# Patient Record
Sex: MALE | Race: WHITE | NOT HISPANIC OR LATINO | Employment: OTHER | URBAN - METROPOLITAN AREA
[De-identification: names, ages, dates, MRNs, and addresses within clinical notes are randomized per-mention and may not be internally consistent; named-entity substitution may affect disease eponyms.]

---

## 2022-10-27 RX ORDER — NAPROXEN SODIUM 220 MG/1
81 TABLET, FILM COATED ORAL DAILY
COMMUNITY
Start: 2022-04-09

## 2022-10-27 RX ORDER — FUROSEMIDE 20 MG/1
20 TABLET ORAL DAILY
COMMUNITY

## 2022-10-27 RX ORDER — CLOPIDOGREL BISULFATE 75 MG/1
75 TABLET ORAL DAILY
COMMUNITY
Start: 2022-05-31 | End: 2022-11-02

## 2022-10-27 RX ORDER — METFORMIN HYDROCHLORIDE 500 MG/1
500 TABLET ORAL 2 TIMES DAILY
COMMUNITY
Start: 2022-03-02

## 2022-10-27 RX ORDER — LANOLIN ALCOHOL/MO/W.PET/CERES
400 CREAM (GRAM) TOPICAL DAILY
COMMUNITY
End: 2023-11-02

## 2022-10-27 RX ORDER — POLYETHYLENE GLYCOL 3350 17 G/17G
17 POWDER, FOR SOLUTION ORAL NIGHTLY PRN
COMMUNITY

## 2022-10-27 RX ORDER — ACETAMINOPHEN 325 MG/1
650 TABLET ORAL EVERY 6 HOURS PRN
COMMUNITY

## 2022-10-27 RX ORDER — GLIMEPIRIDE 2 MG/1
2 TABLET ORAL DAILY
COMMUNITY

## 2022-10-27 RX ORDER — CYST/ALA/Q10/PHOS.SER/DHA/BROC 100-20-50
150 POWDER (GRAM) ORAL DAILY
COMMUNITY

## 2022-10-27 RX ORDER — DOCUSATE SODIUM 100 MG/1
100 CAPSULE, LIQUID FILLED ORAL EVERY MORNING
COMMUNITY
End: 2023-11-02

## 2022-10-27 RX ORDER — PIOGLITAZONEHYDROCHLORIDE 30 MG/1
30 TABLET ORAL DAILY
COMMUNITY
Start: 2022-03-02

## 2022-11-02 ENCOUNTER — OFFICE VISIT (OUTPATIENT)
Dept: NEPHROLOGY | Facility: CLINIC | Age: 87
End: 2022-11-02
Payer: MEDICARE

## 2022-11-02 VITALS
OXYGEN SATURATION: 100 % | HEART RATE: 65 BPM | SYSTOLIC BLOOD PRESSURE: 126 MMHG | BODY MASS INDEX: 28.19 KG/M2 | DIASTOLIC BLOOD PRESSURE: 60 MMHG | WEIGHT: 169.19 LBS | HEIGHT: 65 IN

## 2022-11-02 DIAGNOSIS — N18.32 STAGE 3B CHRONIC KIDNEY DISEASE: Primary | ICD-10-CM

## 2022-11-02 PROCEDURE — 99213 PR OFFICE/OUTPT VISIT, EST, LEVL III, 20-29 MIN: ICD-10-PCS | Mod: S$GLB,,, | Performed by: INTERNAL MEDICINE

## 2022-11-02 PROCEDURE — 99213 OFFICE O/P EST LOW 20 MIN: CPT | Mod: S$GLB,,, | Performed by: INTERNAL MEDICINE

## 2022-11-02 RX ORDER — SACUBITRIL AND VALSARTAN 24; 26 MG/1; MG/1
1 TABLET, FILM COATED ORAL 2 TIMES DAILY
COMMUNITY
Start: 2022-10-27

## 2022-11-02 RX ORDER — CARVEDILOL 6.25 MG/1
6.25 TABLET ORAL 2 TIMES DAILY
COMMUNITY
Start: 2022-10-11

## 2022-11-02 RX ORDER — PANTOPRAZOLE SODIUM 40 MG/1
40 TABLET, DELAYED RELEASE ORAL DAILY
COMMUNITY
Start: 2022-09-26

## 2022-11-02 RX ORDER — ATORVASTATIN CALCIUM 80 MG/1
80 TABLET, FILM COATED ORAL DAILY
COMMUNITY
Start: 2022-09-25

## 2022-11-02 NOTE — PROGRESS NOTES
Pt Name:  Pb Connolly  Pt :  1935  Pt MRN:  [id number]    Date: 2022  Provider: Chastity Younger    Reason for visit: seeing for ckd.      Chief Complaint:   Chief Complaint   Patient presents with    Chronic Kidney Disease     Stage 3 cr 1.43 GFR 44       HPI:  [unfilled]  No complaints.    History:   Past Medical History:   Diagnosis Date    Arrhythmia     CAD (coronary artery disease)     CHF (congestive heart failure)     CKD (chronic kidney disease)     Colon polyp, hyperplastic     Diabetes mellitus, type 2     HLD (hyperlipidemia)     HTN (hypertension)     Internal hemorrhoid     Nocturia     Obesity, unspecified     On anticoagulant therapy     Sigmoid diverticulosis     Skin cancer      Past Surgical History:   Procedure Laterality Date    ABLATION OF DYSRHYTHMIC FOCUS  2022    CARDIAC CATHETERIZATION      COLONOSCOPY      CORONARY STENT PLACEMENT      ESOPHAGOGASTRODUODENOSCOPY      KNEE SURGERY Right     SKIN CANCER EXCISION      TONSILLECTOMY       History reviewed. No pertinent family history.  Social History     Substance and Sexual Activity   Alcohol Use Not Currently     Social History     Substance and Sexual Activity   Drug Use Never     Social History     Substance and Sexual Activity   Sexual Activity Not Currently    Partners: Female     reports that he is not currently sexually active and has had partner(s) who are female.  Social History     Tobacco Use   Smoking Status Never   Smokeless Tobacco Never       Allergies:  Review of patient's allergies indicates:  No Known Allergies    [unfilled]    ROS:   Constitutional:  Denies fever or chills   Eyes:  Denies change in visual acuity   HENT:  Denies nasal congestion or sore throat   Respiratory:  As in the history of the present illness.   Cardiovascular:  As in the history of the present illness.   GI:  As in the history of the present illness.    Musculoskeletal:  Denies back pain or joint pain   Integument:  Denies  "rash   Neurologic:  Denies headache, focal weakness or sensory changes   Endocrine:  Denies polyuria or polydipsia   Lymphatic:  Denies swollen glands   Psychiatric:  Denies depression or anxiety    Physical Exam:   Vitals:   Vitals:    11/02/22 1518   BP: 126/60   Pulse: 65   SpO2: 100%   Weight: 76.7 kg (169 lb 3.2 oz)   Height: 5' 5" (1.651 m)   PainSc:   5     Body mass index is 28.16 kg/m².    Constitutional:  Well developed, well nourished, and in no acute distress   Eyes:  PERRLA, conjunctiva normal   HENT:  Atraumatic, external ears normal, nose normal.  Neck: There is no jugular venous distension or thyromegaly.   Respiratory:  No respiratory distress, normal breath sounds, no rales, no wheezing   Cardiovascular:  Normal rate, and a regular rhythm, no murmurs, no gallops, no rub, and no edema.  GI:  Normal bowel sounds.  Musculoskeletal:  No deformities.   Neurologic:  Alert & oriented x 3, CN 2-12 normal, normal motor function, and no asterixis.   Psychiatric:  Speech and behavior appropriate.    Labs/Tests:  Lab Results   Component Value Date     11/02/2022    K 4.8 11/02/2022     11/02/2022    CO2 25 11/02/2022    BUN 37 (H) 11/02/2022    CREATININE 1.43 (H) 11/02/2022    CREATININE 1.70 (H) 10/05/2022    CREATININE 1.42 (H) 09/14/2022    GLUCOSE 77 11/02/2022    CALCIUM 9.4 11/02/2022    PHOSPHORUS 3.8 11/02/2022    ALBUMIN 3.8 11/02/2022    EGFRNONAA 44 (L) 11/02/2022    EGFRNONAA 36 (L) 10/05/2022    EGFRNONAA 45 (L) 09/14/2022    ESTGFRAFRICA 51 (L) 11/02/2022    ESTGFRAFRICA 41 (L) 10/05/2022    ESTGFRAFRICA 52 (L) 09/14/2022    PTH 96 (H) 11/02/2022    HGB 10.6 (L) 11/02/2022    HGB 10.3 (L) 10/05/2022    HGB 9.9 (L) 09/14/2022    HGBA1C 5.9 (H) 05/13/2021    IRON 55 09/14/2022    TIBC 268 09/14/2022    FERRITIN 212.6 09/14/2022    TRIG 66 05/13/2021    CHOL 112 05/13/2021    HDL 42 05/13/2021    LDLCALC 57 05/13/2021    LABVLDL 13 05/13/2021       Assessment & Plan:    Visit Diagnosis: "   [unfilled]  [unfilled]    Problem List: There is no problem list on file for this patient.    Ckd3b      Follow Up:   Follow up in about 1 year (around 11/2/2023).

## 2023-11-02 ENCOUNTER — OFFICE VISIT (OUTPATIENT)
Dept: NEPHROLOGY | Facility: CLINIC | Age: 88
End: 2023-11-02
Payer: MEDICARE

## 2023-11-02 VITALS
HEART RATE: 75 BPM | WEIGHT: 167 LBS | HEIGHT: 65 IN | SYSTOLIC BLOOD PRESSURE: 115 MMHG | BODY MASS INDEX: 27.82 KG/M2 | OXYGEN SATURATION: 97 % | DIASTOLIC BLOOD PRESSURE: 57 MMHG

## 2023-11-02 DIAGNOSIS — E11.22 TYPE 2 DIABETES MELLITUS WITH STAGE 3B CHRONIC KIDNEY DISEASE, WITHOUT LONG-TERM CURRENT USE OF INSULIN: ICD-10-CM

## 2023-11-02 DIAGNOSIS — N18.32 STAGE 3B CHRONIC KIDNEY DISEASE: Primary | ICD-10-CM

## 2023-11-02 DIAGNOSIS — N18.32 TYPE 2 DIABETES MELLITUS WITH STAGE 3B CHRONIC KIDNEY DISEASE, WITHOUT LONG-TERM CURRENT USE OF INSULIN: ICD-10-CM

## 2023-11-02 DIAGNOSIS — I10 PRIMARY HYPERTENSION: ICD-10-CM

## 2023-11-02 PROCEDURE — 1100F PTFALLS ASSESS-DOCD GE2>/YR: CPT | Mod: CPTII,S$GLB,, | Performed by: INTERNAL MEDICINE

## 2023-11-02 PROCEDURE — 1159F MED LIST DOCD IN RCRD: CPT | Mod: CPTII,S$GLB,, | Performed by: INTERNAL MEDICINE

## 2023-11-02 PROCEDURE — 1100F PR PT FALLS ASSESS DOC 2+ FALLS/FALL W/INJURY/YR: ICD-10-PCS | Mod: CPTII,S$GLB,, | Performed by: INTERNAL MEDICINE

## 2023-11-02 PROCEDURE — 99214 OFFICE O/P EST MOD 30 MIN: CPT | Mod: S$GLB,,, | Performed by: INTERNAL MEDICINE

## 2023-11-02 PROCEDURE — 1160F RVW MEDS BY RX/DR IN RCRD: CPT | Mod: CPTII,S$GLB,, | Performed by: INTERNAL MEDICINE

## 2023-11-02 PROCEDURE — 1157F PR ADVANCE CARE PLAN OR EQUIV PRESENT IN MEDICAL RECORD: ICD-10-PCS | Mod: CPTII,S$GLB,, | Performed by: INTERNAL MEDICINE

## 2023-11-02 PROCEDURE — 3288F FALL RISK ASSESSMENT DOCD: CPT | Mod: CPTII,S$GLB,, | Performed by: INTERNAL MEDICINE

## 2023-11-02 PROCEDURE — 99214 PR OFFICE/OUTPT VISIT, EST, LEVL IV, 30-39 MIN: ICD-10-PCS | Mod: S$GLB,,, | Performed by: INTERNAL MEDICINE

## 2023-11-02 PROCEDURE — 1159F PR MEDICATION LIST DOCUMENTED IN MEDICAL RECORD: ICD-10-PCS | Mod: CPTII,S$GLB,, | Performed by: INTERNAL MEDICINE

## 2023-11-02 PROCEDURE — 1160F PR REVIEW ALL MEDS BY PRESCRIBER/CLIN PHARMACIST DOCUMENTED: ICD-10-PCS | Mod: CPTII,S$GLB,, | Performed by: INTERNAL MEDICINE

## 2023-11-02 PROCEDURE — 1157F ADVNC CARE PLAN IN RCRD: CPT | Mod: CPTII,S$GLB,, | Performed by: INTERNAL MEDICINE

## 2023-11-02 PROCEDURE — 3288F PR FALLS RISK ASSESSMENT DOCUMENTED: ICD-10-PCS | Mod: CPTII,S$GLB,, | Performed by: INTERNAL MEDICINE

## 2023-11-02 RX ORDER — ESCITALOPRAM OXALATE 5 MG/1
5 TABLET ORAL DAILY
COMMUNITY
Start: 2023-10-24

## 2023-11-02 NOTE — PROGRESS NOTES
Pt Name:  Pb Connolly  Pt :  1935  Pt MRN:  [id number]    Date: 2023  Provider: Chastity Younger    Reason for visit: seeing for ckd.      Chief Complaint:   Chief Complaint   Patient presents with    Chronic Kidney Disease     Cre: 1.68 gfr: 39 ckd stage: 3B       HPI:  HPI   No new complaints. No gi, gu, pulm or card symptoms.    History:   Past Medical History:   Diagnosis Date    Arrhythmia     CAD (coronary artery disease)     CHF (congestive heart failure)     CKD (chronic kidney disease)     Colon polyp, hyperplastic     Diabetes mellitus, type 2     HLD (hyperlipidemia)     HTN (hypertension)     Internal hemorrhoid     Nocturia     Obesity, unspecified     On anticoagulant therapy     Sigmoid diverticulosis     Skin cancer      Past Surgical History:   Procedure Laterality Date    ABLATION OF DYSRHYTHMIC FOCUS  2022    CARDIAC CATHETERIZATION      COLONOSCOPY      CORONARY STENT PLACEMENT      ESOPHAGOGASTRODUODENOSCOPY      KNEE SURGERY Right     SKIN CANCER EXCISION      TONSILLECTOMY       History reviewed. No pertinent family history.  Social History     Substance and Sexual Activity   Alcohol Use Not Currently     Social History     Substance and Sexual Activity   Drug Use Never     Social History     Substance and Sexual Activity   Sexual Activity Not Currently    Partners: Female     reports that he is not currently sexually active and has had partner(s) who are female.  Social History     Tobacco Use   Smoking Status Never   Smokeless Tobacco Never       Allergies:  Review of patient's allergies indicates:  No Known Allergies    Current Outpatient Medications   Medication Sig Dispense Refill    acetaminophen (TYLENOL) 325 MG tablet Take 650 mg by mouth every 6 (six) hours as needed.      aspirin 81 MG Chew Take 81 mg by mouth Daily.      atorvastatin (LIPITOR) 80 MG tablet Take 80 mg by mouth once daily.      benfotiamine 150 mg Cap Take 150 mg by mouth Daily.       "carvediloL (COREG) 6.25 MG tablet Take 6.25 mg by mouth 2 (two) times daily.      ENTRESTO 24-26 mg per tablet Take 1 tablet by mouth 2 (two) times daily.      furosemide (LASIX) 20 MG tablet Take 20 mg by mouth Daily.      glimepiride (AMARYL) 2 MG tablet Take 2 mg by mouth Daily.      metFORMIN (GLUCOPHAGE) 500 MG tablet Take 500 mg by mouth 2 (two) times daily.      multivitamin capsule Take 1 capsule by mouth once daily.      pantoprazole (PROTONIX) 40 MG tablet Take 40 mg by mouth once daily.      pioglitazone (ACTOS) 30 MG tablet Take 30 mg by mouth Daily.      polyethylene glycol (GLYCOLAX) 17 gram/dose powder Take 17 g by mouth nightly as needed.      EScitalopram oxalate (LEXAPRO) 5 MG Tab Take 5 mg by mouth Daily.       No current facility-administered medications for this visit.        ROS:   Constitutional:  Denies fever or chills   Eyes:  Denies change in visual acuity   HENT:  Denies nasal congestion or sore throat   Respiratory:  As in the history of the present illness.   Cardiovascular:  As in the history of the present illness.   GI:  As in the history of the present illness.    Musculoskeletal:  Denies back pain or joint pain   Integument:  Denies rash   Neurologic:  Denies headache, focal weakness or sensory changes   Endocrine:  Denies polyuria or polydipsia   Lymphatic:  Denies swollen glands   Psychiatric:  Denies depression or anxiety    Physical Exam:   Vitals:   Vitals:    11/02/23 1114   BP: (!) 115/57   Pulse: 75   SpO2: 97%   Weight: 75.8 kg (167 lb)   Height: 5' 5" (1.651 m)     Body mass index is 27.79 kg/m².    Constitutional:  Well developed, well nourished, and in no acute distress   Eyes:  PERRLA, conjunctiva normal   HENT:  Atraumatic, external ears normal, nose normal.  Neck: There is no jugular venous distension or thyromegaly.   Respiratory:  No respiratory distress, normal breath sounds, no rales, no wheezing   Cardiovascular:  Normal rate, and a regular rhythm, no murmurs, no " gallops, no rub, and no edema.  GI:  Normal bowel sounds.  Musculoskeletal:  No deformities.   Neurologic:  Alert & oriented x 3, CN 2-12 normal, normal motor function, and no asterixis.   Psychiatric:  Speech and behavior appropriate.    Labs/Tests:  Lab Results   Component Value Date     11/02/2022    K 4.8 11/02/2022     11/02/2022    CO2 25 11/02/2022    BUN 37 (H) 11/02/2022    CREATININE 1.43 (H) 11/02/2022    CREATININE 1.70 (H) 10/05/2022    CREATININE 1.42 (H) 09/14/2022    GLUCOSE 77 11/02/2022    CALCIUM 9.4 11/02/2022    PHOSPHORUS 3.8 11/02/2022    ALBUMIN 3.8 11/02/2022    EGFRNONAA 44 (L) 11/02/2022    EGFRNONAA 36 (L) 10/05/2022    EGFRNONAA 45 (L) 09/14/2022    ESTGFRAFRICA 51 (L) 11/02/2022    ESTGFRAFRICA 41 (L) 10/05/2022    ESTGFRAFRICA 52 (L) 09/14/2022     (H) 11/01/2023    HGB 11.2 (L) 08/12/2021    HGB 11.2 (L) 08/03/2021    HGB 11.2 (L) 07/06/2021    HGBA1C 5.7 (H) 08/23/2023    IRON 43 (L) 09/20/2023    TIBC 257 09/20/2023    FERRITIN 80.1 09/20/2023    TRIG 61 09/20/2023    CHOL 112 09/20/2023    HDL 51 09/20/2023    LDLCALC 49 09/20/2023    LABVLDL 12 09/20/2023    [id number] 47.6 08/23/2023       Assessment & Plan:    Visit Diagnosis:   1. Stage 3b chronic kidney disease  PTH, Intact    Renal Function Panel    CBC Auto Differential    Urinalysis    PTH, Intact    Renal Function Panel    CBC Auto Differential    Urinalysis      2. Type 2 diabetes mellitus with stage 3b chronic kidney disease, without long-term current use of insulin        3. Primary hypertension           Problem List: There is no problem list on file for this patient.    Ckd 3b - increase in cr prob from glucosuria and vol depletion - encouraged water drinking.    Dm 2 - no insulin    Htn        1. Stage 3b chronic kidney disease  -     PTH, Intact; Future; Expected date: 02/02/2024  -     Renal Function Panel; Future; Expected date: 02/02/2024  -     CBC Auto Differential; Future; Expected date:  02/02/2024  -     Urinalysis; Future; Expected date: 02/02/2024    2. Type 2 diabetes mellitus with stage 3b chronic kidney disease, without long-term current use of insulin    3. Primary hypertension             Follow Up:   Follow up in about 3 months (around 2/2/2024).